# Patient Record
(demographics unavailable — no encounter records)

---

## 2024-11-01 NOTE — HISTORY OF PRESENT ILLNESS
[Gradual] : gradual [4] : 4 [Dull/Aching] : dull/aching [Intermittent] : intermittent [Rest] : rest [Meds] : meds [Physical therapy] : physical therapy [de-identified] : DOS: R SA, SAD, acromio, GHD, RCR, biceps tenotomy, capsular release  11/1/24: Here for POV of right shoulder. She is in PT with improvement.  She still has pain and difficulty lifting the arm.    9/20/24:  Here for follow up. Now 6 weeks s/p surgery. She has been going to PT.  7/10/24: Here for follow up.  She has been doing HEP but her pain continues.    5/1/24: Here for follow up, MRI review.  MRI R shoulder: Healed greater tuberosity fx, inferior capsular thickening, mild GH DJD.   4/12/24:  Here for follow up.  She reports continued pain in the 'whole arm'.  She reports some improvement after the last injection.   She has take NSAIDs in the past without relief.  She has also done extensive PT, and she does an HEP stretching program currently.    5/31/23: Here for follow up R shoulder. Pain has been gradually returning over the past couple of weeks.  She does at home stretching.    4/26/23: Here for follow up. She states injection helped a little.   2/24/23: Here for follow up. She has been doing HEP. Pain persists.  1/27/23: 71 y/o RHD female here today for the R shoulder.  She had an injury 2 years ago when she fell on the shoulder.   She dislocated the shoulder and had a fracture.  She She has difficulty with most overhead activities.  She reports weakness in the arm.  She has pain posterior in the shoulder.  She feels some pain radiating into the forearm and hand.  She has stiffness in the fingers.  She had a CSI in 11/14/22.  She takes aleve and tylenol occasionally.     MRI R shoulder: 1. Moderate partial tearing at the supraspinatus tendon insertion, mild partial tearing of the infraspinatus and subscapularis tendons, moderate biceps tenosynovitis and superior labral tear. 2. Glenohumeral joint arthrosis, effusion, capsulitis and AC joint arthrosis with lateral acromial bone spurs and narrowing of the supraspinatus outlet. 3. Chronic appearing bony remodeling at the greater tuberosity suggesting remote trauma. 4. No disproportionate muscle atrophy or acute fracture suspected. [] : no [FreeTextEntry1] : RT shoulder  [FreeTextEntry9] : motrin  [de-identified] : PT 2x a week.  [de-identified] : 8/8/2024 [de-identified] :  RT SA SAD ACROMIOPLASTY GHD CAPSULAR RELEASE BICEPS TENOTOMY MERRY

## 2024-11-01 NOTE — ASSESSMENT
[FreeTextEntry1] : She has mild GH DJD, healed gr tub fx, with stiffness. New MRI reviewed: MRI R shoulder: Healed greater tuberosity fx, inferior capsular thickening, mild GH DJD.  Now s/p R SA, SAD, acromio, GHD, RCR, biceps tenotomy, capsular release PT for PROM, AROM as tolerated. Strengthening as tolerated. WB limit 20 lbs. RTO 6 weeks.

## 2024-11-22 NOTE — DISCUSSION/SUMMARY
[de-identified] : After discussing various treatment options with the patient including but not limited to oral medications, physical therapy, exercise, as well as interventional spinal injections, we have decided with the following plan:  - Continue home exercises, stretching, activity modification, physical therapy, and conservative care. - Will order lumbar MRI to rule out HNP. Please let this note serve as a formal request for authorization to perform a MRI of their Lumbar Spine. - Follow-up post diagnostic imaging to review and discuss further treatment. - Will prescribe Methocarbamol (Robaxin) 750mg BID PRN for muscle spasms and to assist with pain relief.

## 2024-11-22 NOTE — PHYSICAL EXAM
[de-identified] : Constitutional; Appears well, no apparent distress Ability to communicate: Normal  Respiratory: non-labored breathing Skin: No rash noted Head: Normocephalic, atraumatic Neck: no visible thyroid enlargement Eyes: Extraocular movements intact Neurologic: Alert and oriented x3 Psychiatric: normal mood, affect and behavior

## 2024-11-22 NOTE — HISTORY OF PRESENT ILLNESS
[Lower back] : lower back [Buttock] : buttock [Left Leg] : left leg [Right Leg] : right leg [9] : 9 [8] : 8 [FreeTextEntry1] : Initial HPI 11/22/2024: Pain started many years ago but recently worsening. Pain is on the RIGHT side of the lower back and radiates down the right thigh and lower leg to the toes described as a sharp shooting cramping pain with associated numbness and tingling and weakness.   MRI Lumbar Spine: none  Conservative Care: has been doing PT  Pain Medications: Tylenol and Mobic PRN, no relief from gabapentin Past Injections: none Spine surgery: none  Blood thinners: none

## 2024-12-06 NOTE — HISTORY OF PRESENT ILLNESS
[Lower back] : lower back [Buttock] : buttock [Left Leg] : left leg [Right Leg] : right leg [9] : 9 [8] : 8 [FreeTextEntry1] : 12/06/2024: Patient here for follow-up to review MRI Lumbar spine. Pain is worse on the right but also gets pain radiating down the left leg.   MRI L-spine 11/30/24 independently reviewed: L3-4 moderate right NF stenosis; L4-5 mild stenosis and moderate NF stenosis; L5-S1 spondy with facet arthropathy   Initial HPI 11/22/2024: Pain started many years ago but recently worsening. Pain is on the RIGHT side of the lower back and radiates down the right thigh and lower leg to the toes described as a sharp shooting cramping pain with associated numbness and tingling and weakness.   MRI Lumbar Spine: none  Conservative Care: has been doing PT  Pain Medications: Tylenol and Mobic PRN, no relief from gabapentin Past Injections: none Spine surgery: none  Blood thinners: none

## 2024-12-06 NOTE — DISCUSSION/SUMMARY
[de-identified] : After discussing various treatment options with the patient including but not limited to oral medications, physical therapy, exercise modalities as well as interventional spinal injections, we have decided with the following plan:  - Continue Home exercises, stretching, activity modification, physical therapy, and conservative care. - MRI report and/or images was reviewed and discussed with the patient. - Recommend B/L L4-5 Transforaminal Epidural Steroid Injection under fluoroscopic guidance with image. - The risks, benefits and alternatives of the proposed procedure were explained in detail with the patient. The risks outlined include but are not limited to infection, bleeding, post-dural puncture headache, nerve injury, a temporary increase in pain, failure to resolve symptoms, allergic reaction, symptom recurrence, and possible elevation of blood sugar in diabetics. All questions were answered to patient's apparent satisfaction and he/she verbalized an understanding. - Patient is presenting with acute/sub-acute radicular pain with impairment in ADLs and functionality.  The pain has not responded to conservative care including NSAID therapy and/or physical therapy.  There is no bleeding tendency, unstable medical condition, or systemic infection. - Follow up in 1-2 weeks post injection for re-evaluation. - continue Methocarbamol (Robaxin) 750mg BID PRN for muscle spasms and to assist with pain relief.

## 2024-12-18 NOTE — HISTORY OF PRESENT ILLNESS
[Gradual] : gradual [4] : 4 [Dull/Aching] : dull/aching [Intermittent] : intermittent [Rest] : rest [Meds] : meds [Physical therapy] : physical therapy [de-identified] : DOS 8/8/24: R SA, SAD, acromio, GHD, RCR, biceps tenotomy, capsular release  12/18/24 - follow up for right shoulder, improving.  reports to feeling a click in the shoulder when lifting the other day.   11/1/24: Here for POV of right shoulder. She is in PT with improvement.  She still has pain and difficulty lifting the arm.    9/20/24:  Here for follow up. Now 6 weeks s/p surgery. She has been going to PT.  7/10/24: Here for follow up.  She has been doing HEP but her pain continues.    5/1/24: Here for follow up, MRI review.  MRI R shoulder: Healed greater tuberosity fx, inferior capsular thickening, mild GH DJD.   4/12/24:  Here for follow up.  She reports continued pain in the 'whole arm'.  She reports some improvement after the last injection.   She has take NSAIDs in the past without relief.  She has also done extensive PT, and she does an HEP stretching program currently.    5/31/23: Here for follow up R shoulder. Pain has been gradually returning over the past couple of weeks.  She does at home stretching.    4/26/23: Here for follow up. She states injection helped a little.   2/24/23: Here for follow up. She has been doing HEP. Pain persists.  1/27/23: 69 y/o RHD female here today for the R shoulder.  She had an injury 2 years ago when she fell on the shoulder.   She dislocated the shoulder and had a fracture.  She She has difficulty with most overhead activities.  She reports weakness in the arm.  She has pain posterior in the shoulder.  She feels some pain radiating into the forearm and hand.  She has stiffness in the fingers.  She had a CSI in 11/14/22.  She takes aleve and tylenol occasionally.     MRI R shoulder: 1. Moderate partial tearing at the supraspinatus tendon insertion, mild partial tearing of the infraspinatus and subscapularis tendons, moderate biceps tenosynovitis and superior labral tear. 2. Glenohumeral joint arthrosis, effusion, capsulitis and AC joint arthrosis with lateral acromial bone spurs and narrowing of the supraspinatus outlet. 3. Chronic appearing bony remodeling at the greater tuberosity suggesting remote trauma. 4. No disproportionate muscle atrophy or acute fracture suspected. [] : no [FreeTextEntry1] : RT shoulder  [FreeTextEntry9] : motrin  [de-identified] : PT 2x a week.  [de-identified] : 8/8/2024 [de-identified] :  RT SA SAD ACROMIOPLASTY GHD CAPSULAR RELEASE BICEPS TENOTOMY MERRY

## 2024-12-18 NOTE — ASSESSMENT
[FreeTextEntry1] : She has mild GH DJD, healed gr tub fx, with stiffness. New MRI reviewed: MRI R shoulder: Healed greater tuberosity fx, inferior capsular thickening, mild GH DJD.  Now s/p R SA, SAD, acromio, GHD, RCR, biceps tenotomy, capsular release PT for PROM, AROM as tolerated. Strengthening as tolerated. WB limit 20 lbs. Encouraged HEP.  RTO 6 weeks.

## 2024-12-18 NOTE — PHYSICAL EXAM
[Right] : right shoulder [4 ___] : forward flexion 4[unfilled]/5 [4___] : abduction 4[unfilled]/5 [] : no sensory deficits [FreeTextEntry9] :  A 120P ER 50

## 2025-01-14 NOTE — REASON FOR VISIT
[FreeTextEntry2] :  01/14/2025:  72 year old female here today for: follow up right hand pain Brachial neuritis is the same since last visit ; MF stiffness She had R shoulder surgery since last visit

## 2025-01-14 NOTE — PHYSICAL EXAM
[de-identified] : R shoulder Significant Stiffness in all planes  R elbow Pos tinels at the cubital tunnel  R hand:  Tender volar wrist  MF stiffness- limited flexion; tender A1 She has AROM  SF PIP flexion contracture Decreased sensation median nerve distribution +thenar atrophy Intrinsic atrophy

## 2025-01-14 NOTE — ASSESSMENT
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 Treatment options for carpal tunnel include OTC NSAIDS, prescription NSAIDS, ice, bracing, OT, cortisone injection, and surgical decomp/transp.  I rec R elbow ulna nerve decomp/transp at the elbow R/B/A of surgery discussed with the patient. Risks including but not limited to infection, nerve damage, tendon damage, pain, stiffness, recurrence, no resolution of symptoms, loss of function, limb or life. They understand and agree to surgery  Return post op  She will wait for shoulder healing and improved ROM prior to surgery   R MF trigger  Trigger finger is a progressive problem that once occurs will be a chronic issue that will likely continue until surgical treatment is necessary. Treatment options for trigger finger include OTC NSAIDS, prescription NSAIDS, ice, splinting, OT, cortisone injection, and surgical release.  I prescribed OT 2-3 times a week for 4-6 weeks  For R MF flexor tenolysis R/B/A of surgery discussed with the patient. Risks including but not limited to infection, nerve damage, tendon damage, pain, stiffness, recurrence, no resolution of symptoms, loss of function, limb or life. They understand and agree to surgery  Return post op

## 2025-01-14 NOTE — HISTORY OF PRESENT ILLNESS
[6] : 6 [5] : 5 [Dull/Aching] : dull/aching [de-identified] : She has R UE brachial neurotis, cubital tunnel  She has R MF stiffness [FreeTextEntry1] : right hand

## 2025-02-12 NOTE — HISTORY OF PRESENT ILLNESS
[Gradual] : gradual [4] : 4 [Dull/Aching] : dull/aching [Intermittent] : intermittent [Rest] : rest [Meds] : meds [Physical therapy] : physical therapy [de-identified] : DOS 8/8/24: R SA, SAD, acromio, GHD, RCR, biceps tenotomy, capsular release  02/12/2025- follow up for right shoulder, weather change causes itch burning sensation. She has not attend PT.  12/18/24 - follow up for right shoulder, improving.  reports to feeling a click in the shoulder when lifting the other day.   11/1/24: Here for POV of right shoulder. She is in PT with improvement.  She still has pain and difficulty lifting the arm.    9/20/24:  Here for follow up. Now 6 weeks s/p surgery. She has been going to PT.  7/10/24: Here for follow up.  She has been doing HEP but her pain continues.    5/1/24: Here for follow up, MRI review.  MRI R shoulder: Healed greater tuberosity fx, inferior capsular thickening, mild GH DJD.   4/12/24:  Here for follow up.  She reports continued pain in the 'whole arm'.  She reports some improvement after the last injection.   She has take NSAIDs in the past without relief.  She has also done extensive PT, and she does an HEP stretching program currently.    5/31/23: Here for follow up R shoulder. Pain has been gradually returning over the past couple of weeks.  She does at home stretching.    4/26/23: Here for follow up. She states injection helped a little.   2/24/23: Here for follow up. She has been doing HEP. Pain persists.  1/27/23: 71 y/o RHD female here today for the R shoulder.  She had an injury 2 years ago when she fell on the shoulder.   She dislocated the shoulder and had a fracture.  She She has difficulty with most overhead activities.  She reports weakness in the arm.  She has pain posterior in the shoulder.  She feels some pain radiating into the forearm and hand.  She has stiffness in the fingers.  She had a CSI in 11/14/22.  She takes aleve and tylenol occasionally.     MRI R shoulder: 1. Moderate partial tearing at the supraspinatus tendon insertion, mild partial tearing of the infraspinatus and subscapularis tendons, moderate biceps tenosynovitis and superior labral tear. 2. Glenohumeral joint arthrosis, effusion, capsulitis and AC joint arthrosis with lateral acromial bone spurs and narrowing of the supraspinatus outlet. 3. Chronic appearing bony remodeling at the greater tuberosity suggesting remote trauma. 4. No disproportionate muscle atrophy or acute fracture suspected. [] : no [FreeTextEntry1] : RT shoulder  [FreeTextEntry9] : motrin  [de-identified] : PT 2x a week.  [de-identified] : 8/8/2024 [de-identified] :  RT SA SAD ACROMIOPLASTY GHD CAPSULAR RELEASE BICEPS TENOTOMY MERRY

## 2025-02-12 NOTE — ASSESSMENT
[FreeTextEntry1] : She has mild GH DJD, healed gr tub fx, with stiffness. New MRI reviewed: MRI R shoulder: Healed greater tuberosity fx, inferior capsular thickening, mild GH DJD.  Now s/p R SA, SAD, acromio, GHD, RCR, biceps tenotomy, capsular release She completed PT. She will focus on HEP stretching.  RTO 3 months.

## 2025-02-12 NOTE — PHYSICAL EXAM
[Right] : right shoulder [4 ___] : forward flexion 4[unfilled]/5 [4___] : abduction 4[unfilled]/5 [] : no sensory deficits [FreeTextEntry9] :  A 130P ER 50

## 2025-02-18 NOTE — DISCUSSION/SUMMARY
[de-identified] : After discussing various treatment options with the patient including but not limited to oral medications, physical therapy, exercise modalities as well as interventional spinal injections, we have decided with the following plan:  - Continue Home exercises, stretching, activity modification, physical therapy, and conservative care. - MRI report and/or images was reviewed and discussed with the patient. - Recommend B/L L4-5 Transforaminal Epidural Steroid Injection under fluoroscopic guidance with image. - The risks, benefits and alternatives of the proposed procedure were explained in detail with the patient. The risks outlined include but are not limited to infection, bleeding, post-dural puncture headache, nerve injury, a temporary increase in pain, failure to resolve symptoms, allergic reaction, symptom recurrence, and possible elevation of blood sugar in diabetics. All questions were answered to patient's apparent satisfaction and he/she verbalized an understanding. - Patient is presenting with acute/sub-acute radicular pain with impairment in ADLs and functionality.  The pain has not responded to conservative care including NSAID therapy and/or physical therapy.  There is no bleeding tendency, unstable medical condition, or systemic infection. - Follow up in 1-2 weeks post injection for re-evaluation. - Will call to schedule.

## 2025-02-18 NOTE — HISTORY OF PRESENT ILLNESS
[Lower back] : lower back [Buttock] : buttock [Left Leg] : left leg [Right Leg] : right leg [9] : 9 [8] : 8 [FreeTextEntry1] : 2/18/25: s/p B/L L4-5 TFESI on 02/05/25 with 70% relief and improvement of ADLs. Has been feeling much better since injection. Had alot of pain during procedure and would like anesthesia for next procedure. Tried Methocarbamol with no relief.   12/06/2024: Patient here for follow-up to review MRI Lumbar spine. Pain is worse on the right but also gets pain radiating down the left leg.   MRI L-spine 11/30/24 independently reviewed: L3-4 moderate right NF stenosis; L4-5 mild stenosis and moderate NF stenosis; L5-S1 spondy with facet arthropathy   Initial HPI 11/22/2024: Pain started many years ago but recently worsening. Pain is on the RIGHT side of the lower back and radiates down the right thigh and lower leg to the toes described as a sharp shooting cramping pain with associated numbness and tingling and weakness.   MRI Lumbar Spine: none  Conservative Care: has been doing PT  Pain Medications: Tylenol and Mobic PRN, no relief from gabapentin Past Injections: none Spine surgery: none  Blood thinners: none

## 2025-02-18 NOTE — PHYSICAL EXAM
[de-identified] : Constitutional; Appears well, no apparent distress Ability to communicate: Normal  Respiratory: non-labored breathing Skin: No rash noted Head: Normocephalic, atraumatic Neck: no visible thyroid enlargement Eyes: Extraocular movements intact Neurologic: Alert and oriented x3 Psychiatric: normal mood, affect and behavior [] : non-antalgic

## 2025-02-25 NOTE — REASON FOR VISIT
[FreeTextEntry2] : 02/25/2025: 72 year old female here for F/U appointment. follow up right hand pain Brachial neuritis is the same since last visit. Has been going to PT.

## 2025-02-25 NOTE — HISTORY OF PRESENT ILLNESS
[6] : 6 [5] : 5 [Dull/Aching] : dull/aching [de-identified] : She has R brachial neuritis, cubtial tunnel and MF trigger I rec surgery for cubtial tunnel adn trifggger She does not want at this time She wants to cont with therapy

## 2025-02-25 NOTE — PHYSICAL EXAM
[de-identified] : R shoulder Significant stiffness in all planes  R elbow Pos tinels at the cubital tunnel  R hand:  Tender volar wrist  MF stiffness- limited flexion; tender A1 She has AROM  SF PIP flexion contracture Decreased sensation median nerve distribution +thenar atrophy Intrinsic atrophy

## 2025-02-25 NOTE — ASSESSMENT
[FreeTextEntry1] : Brachial neuritis This is a chronic nerve problem that is expected to not improve I prescribed PT 2-3 times a week for 4-6 weeks   Cubital tunnel syndrome is a progressive problem that once occurs will be a chronic issue that will likely continue until surgical treatment is necessary. Cubital tunnel may or may not be symptomatic. Treatment options for cubital tunnel include OTC NSAIDS, prescription NSAIDS, ice, bracing, OT, cortisone injection, and surgical release.  I rec R ulna enrve decomp/transp a tthe elbow R/B/A of surgery discussed with the patient. Risks including but not limited to infection, nerve damage, tendon damage, pain, stiffness, recurrence, no resolution of symptoms, loss of function, limb or life. They understand and agree to surgery  Return post op  MF   Trigger finger is a progressive problem that once occurs will be a chronic issue that will likely continue until surgical treatment is necessary. Trigger finger creates a chronic change to the tendon/pulley system in the hand that will be present until surgical release. Treatment options for trigger finger include OTC NSAIDS, prescription NSAIDS, ice, splinting, OT, cortisone injection, and surgical release.  I rec R MF flexor tenolysis R/B/A of surgery discussed with the patient. Risks including but not limited to infection, nerve damage, tendon damage, pain, stiffness, recurrence, no resolution of symptoms, loss of function, limb or life. They understand and agree to surgery  Return post op

## 2025-05-13 NOTE — HISTORY OF PRESENT ILLNESS
[Dull/Aching] : dull/aching [7] : 7 [6] : 6 [Radiating] : radiating [Sharp] : sharp [Shooting] : shooting [Throbbing] : throbbing [Tingling] : tingling [de-identified] : She has R brachial neuritis, cubital tunnel and MF trigger I rec surgery for cubital tunnel and trigger She did not want to proceed She stopped therapy in Feb [FreeTextEntry1] : Right hand

## 2025-05-13 NOTE — REASON FOR VISIT
[FreeTextEntry2] : 05/13/2025: 72-year-old female here for F/U appointment. follow up right hand pain Brachial neuritis is the same since last visit. Has stopped PT since February. Pain in the hand has been getting worst and radiating to her fingers.

## 2025-05-13 NOTE — PHYSICAL EXAM
[de-identified] : R shoulder Significant stiffness in all planes  R elbow Pos tinels at the cubital tunnel  R hand:  Tender volar wrist  MF stiffness- limited flexion; tender A1 She has AROM  SF PIP flexion contracture Decreased sensation median and ulna nerve distribution +thenar atrophy Intrinsic atrophy

## 2025-05-13 NOTE — ASSESSMENT
[FreeTextEntry1] : Brachial neuritis This is a chronic nerve problem that is expected to not improve I prescribed PT 2-3 times a week for 4-6 weeks   Cubital tunnel syndrome is a progressive problem that once occurs will be a chronic issue that will likely continue until surgical treatment is necessary. Cubital tunnel may or may not be symptomatic. Treatment options for cubital tunnel include OTC NSAIDS, prescription NSAIDS, ice, bracing, OT, cortisone injection, and surgical release.  I rec EMG REturn after EMG  MF   Trigger finger is a progressive problem that once occurs will be a chronic issue that will likely continue until surgical treatment is necessary. Trigger finger creates a chronic change to the tendon/pulley system in the hand that will be present until surgical release. Treatment options for trigger finger include OTC NSAIDS, prescription NSAIDS, ice, splinting, OT, cortisone injection, and surgical release.  I rec R MF flexor tenolysis R/B/A of surgery discussed with the patient. Risks including but not limited to infection, nerve damage, tendon damage, pain, stiffness, recurrence, no resolution of symptoms, loss of function, limb or life. They understand and agree to surgery  Return post op

## 2025-05-27 NOTE — PHYSICAL EXAM
[de-identified] : R shoulder Significant stiffness in all planes  R elbow Pos tinels at the cubital tunnel  R hand:  Tender volar wrist  MF stiffness- limited flexion; tender A1 She has AROM  SF PIP flexion contracture Decreased sensation median and ulna nerve distribution +thenar atrophy Intrinsic atrophy

## 2025-05-27 NOTE — HISTORY OF PRESENT ILLNESS
[7] : 7 [6] : 6 [Dull/Aching] : dull/aching [Radiating] : radiating [Sharp] : sharp [Shooting] : shooting [Throbbing] : throbbing [Tingling] : tingling [de-identified] : She has R brachial neuritis, cubital tunnel and MF trigger Numbness  I rec surgery for cubital tunnel and trigger She did not want to proceed She stopped therapy in Feb  For the first time I independently reviewed the upper extremity EMG from 5/21/2025 The clinically relevant findings shows severe cubital tunnel; mild CTS [FreeTextEntry1] : R hand/ elbow/ shoulder

## 2025-05-27 NOTE — REASON FOR VISIT
[FreeTextEntry2] :  05/27/2025:  72 year old female here today for: follow up R hand/ elbow/ shoulder pain, trigger finger, cubital tunnel and Brachial neuritis, she had an EMG on 5/21/25 at [facility]

## 2025-05-27 NOTE — ASSESSMENT
[FreeTextEntry1] :   Carpal tunnel syndrome is a progressive problem that once occurs will be a chronic issue that will likely continue until surgical treatment is necessary. Carpal tunnel may or may not be symptomatic. Treatment options for carpal tunnel include OTC NSAIDS, prescription NSAIDS, ice, bracing, OT, cortisone injection, and surgical release.  Cubital tunnel syndrome is a progressive problem that once occurs will be a chronic issue that will likely continue until surgical treatment is necessary. Cubital tunnel may or may not be symptomatic. Treatment options for cubital tunnel include OTC NSAIDS, prescription NSAIDS, ice, bracing, OT, cortisone injection, and surgical release.  Trigger finger is a progressive problem that once occurs will be a chronic issue that will likely continue until surgical treatment is necessary. Trigger finger creates a chronic change to the tendon/pulley system in the hand that will be present until surgical release. Treatment options for trigger finger include OTC NSAIDS, prescription NSAIDS, ice, splinting, OT, cortisone injection, and surgical release.  For R ulna nerve decomp/transp at the elbow, R CTR, R MF trigger release R/B/A of surgery discussed with the patient. Risks including but not limited to infection, nerve damage, tendon damage, pain, stiffness, recurrence, no resolution of symptoms, loss of function, limb or life. They understand and agree to surgery  Return post op